# Patient Record
Sex: FEMALE | Race: WHITE
[De-identification: names, ages, dates, MRNs, and addresses within clinical notes are randomized per-mention and may not be internally consistent; named-entity substitution may affect disease eponyms.]

---

## 2020-08-08 ENCOUNTER — HOSPITAL ENCOUNTER (EMERGENCY)
Dept: HOSPITAL 63 - ER | Age: 35
Discharge: HOME | End: 2020-08-08
Payer: COMMERCIAL

## 2020-08-08 VITALS — HEIGHT: 67 IN | WEIGHT: 172.18 LBS | BODY MASS INDEX: 27.02 KG/M2

## 2020-08-08 VITALS — SYSTOLIC BLOOD PRESSURE: 115 MMHG | DIASTOLIC BLOOD PRESSURE: 74 MMHG

## 2020-08-08 DIAGNOSIS — F32.9: ICD-10-CM

## 2020-08-08 DIAGNOSIS — F17.210: ICD-10-CM

## 2020-08-08 DIAGNOSIS — E06.3: ICD-10-CM

## 2020-08-08 DIAGNOSIS — N99.89: Primary | ICD-10-CM

## 2020-08-08 DIAGNOSIS — G43.909: ICD-10-CM

## 2020-08-08 DIAGNOSIS — E03.9: ICD-10-CM

## 2020-08-08 LAB
APTT PPP: (no result) S
BACTERIA #/AREA URNS HPF: (no result) /HPF
BILIRUB UR QL STRIP: (no result)
FIBRINOGEN PPP-MCNC: (no result) MG/DL
GLUCOSE UR STRIP-MCNC: (no result) MG/DL
NITRITE UR QL STRIP: (no result)
RBC #/AREA URNS HPF: (no result) /HPF (ref 0–2)
SP GR UR STRIP: 1.01
SQUAMOUS #/AREA URNS LPF: (no result) /LPF
U PREG PATIENT: NEGATIVE
UROBILINOGEN UR-MCNC: 0.2 MG/DL
WBC #/AREA URNS HPF: (no result) /HPF (ref 0–4)

## 2020-08-08 PROCEDURE — 81025 URINE PREGNANCY TEST: CPT

## 2020-08-08 PROCEDURE — 99285 EMERGENCY DEPT VISIT HI MDM: CPT

## 2020-08-08 PROCEDURE — 81001 URINALYSIS AUTO W/SCOPE: CPT

## 2020-08-08 PROCEDURE — 99284 EMERGENCY DEPT VISIT MOD MDM: CPT

## 2020-08-08 PROCEDURE — 51702 INSERT TEMP BLADDER CATH: CPT

## 2020-08-08 NOTE — PHYS DOC
Past History


Past Medical History:  Depression, Hypothyroid, Migraines, Other


Past Surgical History:  Other


Smoking:  Cigarettes


Alcohol Use:  None


Drug Use:  None





General Adult


HPI:


HPI:


33 yo F PMH hashimotos thyroiditis w/hypothyroidism, depression, migraines and 

urinary incontinence s/p bladder sling 8/3 (5 days ago, on oxybutynin and 

hyoscyamine) presents to the ed with inability to urinate and painful lower 

abdominal distention.  Patient states after the surgery she had a catheter 

placed that was removed the day after.  Patient was unable to urinate for 6-1/2 

hours and the catheter was put back in the same day.  Catheter was removed today

at 1:45 PM, Dr. Garzon ( urologist) reported her internal/external incisions

are healing well.  Urologist inserted 900 cc of solution in her bladder today 

and patient voided in her office.  Patient states that was the last time she was

able to void today.  Was told that because patient is so young she might have 

the adverse effect of urinary retention, "sling will work too well." Next 

appointment is in 4 weeks. No sexually activity since surgery. Reports urinary 

incontinence that started at 14, made worse after 4 children.





Review of systems: Denies associated fever, chills, cough, shortness of breath, 

dyspnea, diaphoresis, neck symptoms, headache, chest pain, abdominal pain, 

dysuria, hematuria, flank pain, abnormal vaginal bleeding or discharge.





Allergies:


Allergies:





Allergies








Coded Allergies Type Severity Reaction Last Updated Verified


 


  No Known Drug Allergies    4/11/14 No











Physical Exam:


PE:





Constitutional: Well developed, well nourished, uncomfortable on arrival, 

instant relief with catheter placed, non-toxic appearance. []


HENT: Normocephalic, atraumatic, bilateral external ears normal, nose normal. []


Eyes:  EOMI, conjunctiva normal, no discharge. [] 


Neck: Normal range of motion, no tenderness, supple, no stridor. [] 


Cardiovascular:Heart rate regular rhythm,


Lungs & Thorax: Speaking in full sentences, bilateral equal chest rise []


Abdomen: Bowel sounds normal, soft, no tenderness, no masses, no pulsatile 

masses. [] lower pelvic incision c/d/i no dehisence


Skin: Warm, dry, no erythema, no rash. [] 


Back: No tenderness, no CVA tenderness. [] 


Extremities: No tenderness, no cyanosis, no clubbing, ROM intact, no edema. [] 


Neurologic: Alert and oriented X 3, normal motor function, normal sensory 

function, 


Psychologic: Affect normal, judgement normal, mood normal. []


: 981 cc on bladder scan, straight cath w/1750 cc clear yellow, no sediment or

 pyruia/hematuria, post cath placement 190 ccs, external vaginal exam w/right 

labial bruises -pt reports no pain, no VB/rash/ulcer





EKG:


EKG:


[]





Radiology/Procedures:


Radiology/Procedures:


[]


Impressions:


Concern for postoperative urinary retention s/p sling for urinary incontinence. 

U/a w/no infection. Urine preg negative. Spoke with [facility] urology resident on call 

(Wilson) who was very familiar with pts' care. Recommended goodrich placement, no 

change in medications, Dr. Dawn' clinic will reach out to pt for followup 

in the next week.  Patient with complete resolution of symptoms after her 

bladder was drained.  Life-threatening processes considered during ed visit, low

 suspicion given hx/pe.  Strict ED return precautions given for recurrence of 

symptoms.  All of patient's questions were answered and she was stable at time 

of discharge.





I spoken with the patient and her caregivers.  I explained the patient's 

condition, diagnoses and treatment plan based on the information available to me

 at this time.  I have answered the patient and her caregiver's questions and 

addressed any concerns.  The patient and her caregivers have a good 

understanding of patient's diagnosis, condition and treatment plan as can be 

expected at this point.  Vital signs have been stable.  Patient's condition is 

stable and appropriate for discharge from the emergency department. 





Patient will pursue further outpatient evaluation with primary care physician or

 other designated or consulting physician as outlined in the discharge 

instructions.  The patient and/or caregivers are agreeable to this plan of care 

and follow-up instructions have been explained in detail.  The patient and/or 

caregivers have received these instructions in written form and have expressed 

an understanding of the discharge instructions.  The patient and/or caregivers 

are aware that any significant change of condition or worsening of symptoms 

should prompt immediate return to this or the closest emergency department or 

call to 911.





Course & Med Decision Making:


Course & Med Decision Making


Pertinent Labs and Imaging studies reviewed. (See chart for details)





[]





Dragon Disclaimer:


Dragon Disclaimer:


This electronic medical record was generated, in whole or in part, using a voice

 recognition dictation system.





Departure


Departure:


Impression:  


   Primary Impression:  


   Acute urinary retention


   Additional Impression:  


   Postoperative urinary retention


Disposition:  01 HOME/RESIDENCE PRIOR TO ADM


Condition:  STABLE


Referrals:  


VALERIY JOHNSON MD (PCP)


Followup w/Dr. Lance Garcia, urology


[phone]


Patient Instructions:  Urinary Retention, Acute, Female





Justification of Admission:


Justification of Admission:


Justification of Admission Dx:  N/A











ALMA HUTCHINSON DO                Aug 8, 2020 00:50